# Patient Record
(demographics unavailable — no encounter records)

---

## 2024-11-27 NOTE — PHYSICAL EXAM
[de-identified] : No tenderness over the left elbow no evidence of instability.  There is swelling ecchymosis over the left distal radius with obvious clinical deformity.  Compartments are soft.  There is limited range of motion wrist secondary to swelling discomfort but no tenderness over the CMC joints MP PIP or DIP joints and he is moving the digits well. EPL is functioning There is good capillary refill but positive Tinel's over the median nerve of the left wrist.  Sensation decreased in the median nerve distribution specifically the thumb by patient report [de-identified] : PA lateral and obliques of both wrist shows slight ulnar positive variance on the right greater on the left.  There is a metaphyseal fracture of the left distal radius with dorsal Trillat and dorsal comminution

## 2024-11-27 NOTE — ASSESSMENT
[FreeTextEntry1] : Comminuted extra-articular left distal radius fracture with dorsal tilt and secondary carpal tunnel syndrome.  Options were discussed ranging from conservative management, casting, closed reduction, or stabilization with decompression of the median nerve.  Risk associated with each option discussed including risk of bleeding infection nerve and tendon injury stiffness pain syndrome instability development of arthritis limitation of function tissue loss functional loss and other issues associated with both conservative management and surgical intervention.  Patient appeared understand his risk and all questions answered.  Vitamin C prophylaxis discussed A prescription for therapy to encourage range of motion and control edema preop as well as a better splint was provided.  Patient would like to proceed with open reduction internal fixation left distal radius fracture entheses skeletal dynamics) and left carpal tunnel release.  Patient would like a note for his college courses explaining that he needs surgery for his left distal radius on an emergent basis and any accommodations would be appreciated such as extra time, online exams, and he may not be able to attend all of his classes.

## 2024-11-27 NOTE — HISTORY OF PRESENT ILLNESS
[Right] : right hand dominant [FreeTextEntry1] : DOI-11/23/24 Patient presents with 4 days status post the left wrist injury sustained from a fall.  Patient was treated at [facility] where x-rays were done showing a left distal radius fracture.  Patient was placed in a removable splint.  He reports feeling numbness on the left first digit.  He denies any discomfort on the right wrist.

## 2024-12-16 NOTE — HISTORY OF PRESENT ILLNESS
[___ Days Post Op] : post op day #[unfilled] [de-identified] : DOS: 12/4/24 [de-identified] : 12 days status post open reduction and internal fixation of left distal radius fracture with carpal tunnel release [de-identified] : The incision lines are clean and dry.  He is moving the wrist and digits well without of crepitance of instability negative Tinel's sensation grossly intact. [de-identified] : PA lateral and oblique of the left wrist shows hardware in place with excellent alignment. [de-identified] : 12 days status post open reduction and internal fixation of left distal radius fracture with carpal tunnel release [de-identified] : Sutures were removed a protective splint and home program outlined to reduce the risk of complications and referral to outside therapy.  Return to the office in 2 weeks